# Patient Record
Sex: FEMALE | Race: WHITE | Employment: FULL TIME
[De-identification: names, ages, dates, MRNs, and addresses within clinical notes are randomized per-mention and may not be internally consistent; named-entity substitution may affect disease eponyms.]

---

## 2024-08-21 ENCOUNTER — HOSPITAL ENCOUNTER (OUTPATIENT)
Facility: HOSPITAL | Age: 39
Setting detail: RECURRING SERIES
Discharge: HOME | End: 2024-08-24
Payer: COMMERCIAL

## 2024-08-21 PROCEDURE — 97112 NEUROMUSCULAR REEDUCATION: CPT

## 2024-08-21 PROCEDURE — 97530 THERAPEUTIC ACTIVITIES: CPT

## 2024-08-21 PROCEDURE — 97162 PT EVAL MOD COMPLEX 30 MIN: CPT

## 2024-08-21 NOTE — PROGRESS NOTES
[facility] at the [address], [address], [place] [address]   Phone: [phone]     Fax: [phone]       Plan of Care/ Statement of Necessity for Physical Therapy Services    Patient name: Loreto Sutherland Start of Care: 2024   Referral source: Karyn Jade* : 1985    Medical Diagnosis: Right hip pain [M25.551]  Pain in left hip [M25.552]       Onset Date:2023    Treatment Diagnosis: M25.551  RIGHT HIP PAIN                             Prior Hospitalization: see medical history Provider#: [id number]   Medications: Verified on Patient Summary List     Co-morbidities: PMHx/Surgical Hx:  []DM [] HTN (controlled) [] High cholesterol (controlled) [] Cancer [x] Arthritis   [x]Other fibromyalgia, right knee, lower back pain, inverted sternum-surgery to repair   Prior Level of Function:  functionally independent, no AD,   Social/Recreation/Work: 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       The Plan of Care and following information is based on the information from the initial evaluation.  Assessment/ hammonds information:  Loreto Sutherland is a 38 y.o.  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 Patient reports having constant sharp/aching/tightness pain at right lateral hip, right feeling down right ITband, and lower back that increases in the am, laying on side, staying in one position too long, sitting for a long period of time, squatting, and crossing leg which limits their ability with sleeping, car rides, working at desk, stairs, 
working at desk, stairs, chores, and IADLs. Pt has relief with rest, changing positions, Voltram, flexeril, and heat. Pt rates pain 8__/10 max (in the last week) __1_/10 min (in the last week) __1_/10 currently at rest. Pt with intermittent numbness down the right leg into the foot with sitting for a long period of time. Pt sensation is intact to light touch. Patient demonstrates decreased ROM, decreased strength, impaired posture,  and decreased functional mobility tolerance. Pt tender to palpate along right glut med, right piriformis, right glut min, and right lateral greater trochanter. Pt with positive right RENEE, FADIIR, and SCOUR test.  Pt also with positive adductor drop test, dec in lower trunk rotation, and dec in reversal of lumbar lordosis with forward flexion. Pt signs and symptoms are consistent with mechanical lower back and right hip pain with lumbo-pelvic dysfunction. Pt would benefit from skilled physical therapy to address these limitations.      Patient will continue to benefit from skilled PT services to modify and progress therapeutic interventions, analyze and address functional mobility deficits, analyze and address ROM deficits, analyze and address strength deficits, analyze and address soft tissue restrictions, analyze and cue for proper movement patterns, analyze and modify for postural abnormalities, analyze and address imbalance/dizziness, and instruct in home and community integration to address functional deficits and attain remaining goals.    Short Term Goals:   To be accomplished in 6 weeks:  1..Patient will report compliance with HEP 1x/day to aid in rehabilitation program.  Status at IE:Pt was given HEP handout and appeared to understand.  Current:Same as IE     2.Patient will rate pain on greater than 6/10 so pt can perform ADL's.  Status at IE:8__/10 max (in the last week) __1_/10 min (in the last week) __1_/10 currently at rest  Current: Same as IE    Long Term Goals: To be

## 2024-08-30 ENCOUNTER — HOSPITAL ENCOUNTER (OUTPATIENT)
Facility: HOSPITAL | Age: 39
Setting detail: RECURRING SERIES
End: 2024-08-30
Payer: COMMERCIAL

## 2024-08-30 PROCEDURE — 97112 NEUROMUSCULAR REEDUCATION: CPT

## 2024-08-30 PROCEDURE — 97530 THERAPEUTIC ACTIVITIES: CPT

## 2024-08-30 PROCEDURE — 97140 MANUAL THERAPY 1/> REGIONS: CPT

## 2024-08-30 NOTE — PROGRESS NOTES
PHYSICAL / OCCUPATIONAL THERAPY - DAILY TREATMENT NOTE     Patient Name: Loreto Sutherland    Date: 2024    : 1985  Insurance: Payor: DASHA / Plan: [facility] [place] / Product Type: *No Product type* /      Patient  verified Yes     Visit #   Current / Total 2 24   Time   In / Out 9:15 10:04   Pain   In / Out 4 2   Subjective Functional Status/Changes: Reports that she is sore today , just woke up with inc in pain.   Changes to:  Allergies, Med Hx, Sx Hx?   no       TREATMENT AREA =  Right hip pain [M25.551]  Pain in left hip [M25.552]    If an interpreting service is utilized for treatment of this patient, the contents of this document represent the material reviewed with the patient via the .     OBJECTIVE  Therapeutic Procedures:  Tx Min Billable or 1:1 Min (if diff from Tx Min) Procedure, Rationale, Specifics   5  22259 Therapeutic Exercise (timed):  increase ROM, strength, coordination, balance, and proprioception to improve patient's ability to progress to PLOF and address remaining functional goals. (see flow sheet as applicable)    Details if applicable:       15  60281 Therapeutic Activity (timed):  use of dynamic activities replicating functional movements to increase ROM, strength, coordination, balance, and proprioception in order to improve patient's ability to progress to PLOF and address remaining functional goals.  (see flow sheet as applicable)    Details if applicable:     8  22675 Manual Therapy (timed):  decrease pain, increase ROM, increase tissue extensibility, decrease trigger points, and increase postural awareness to improve patient's ability to progress to PLOF and address remaining functional goals.  The manual therapy interventions were performed at a separate and distinct time from the therapeutic activities interventions . Details:        Details if applicable:  Pt in hook lying position: LONG Left AIC correction: Superior T4 (LONG technique) Sternal mobilization with

## 2024-09-04 ENCOUNTER — TELEPHONE (OUTPATIENT)
Facility: HOSPITAL | Age: 39
End: 2024-09-04

## 2024-09-04 NOTE — TELEPHONE ENCOUNTER
Called to see if she is able to come in on CW's 1:20 opening. Acceptd 10:00 opening for tomorrow instead.

## 2024-09-05 ENCOUNTER — HOSPITAL ENCOUNTER (OUTPATIENT)
Facility: HOSPITAL | Age: 39
Setting detail: RECURRING SERIES
Discharge: HOME | End: 2024-09-08
Payer: COMMERCIAL

## 2024-09-05 PROCEDURE — 97530 THERAPEUTIC ACTIVITIES: CPT

## 2024-09-05 PROCEDURE — 97112 NEUROMUSCULAR REEDUCATION: CPT

## 2024-09-05 PROCEDURE — 97140 MANUAL THERAPY 1/> REGIONS: CPT

## 2024-09-09 ENCOUNTER — HOSPITAL ENCOUNTER (OUTPATIENT)
Facility: HOSPITAL | Age: 39
Setting detail: RECURRING SERIES
Discharge: HOME | End: 2024-09-12
Payer: COMMERCIAL

## 2024-09-09 PROCEDURE — 97535 SELF CARE MNGMENT TRAINING: CPT

## 2024-09-09 PROCEDURE — 97112 NEUROMUSCULAR REEDUCATION: CPT

## 2024-09-09 PROCEDURE — 97530 THERAPEUTIC ACTIVITIES: CPT

## 2024-09-11 ENCOUNTER — HOSPITAL ENCOUNTER (OUTPATIENT)
Facility: HOSPITAL | Age: 39
Setting detail: RECURRING SERIES
Discharge: HOME | End: 2024-09-14
Payer: COMMERCIAL

## 2024-09-11 PROCEDURE — 97530 THERAPEUTIC ACTIVITIES: CPT

## 2024-09-11 PROCEDURE — 97112 NEUROMUSCULAR REEDUCATION: CPT

## 2024-09-11 PROCEDURE — 97110 THERAPEUTIC EXERCISES: CPT

## 2024-09-16 ENCOUNTER — HOSPITAL ENCOUNTER (OUTPATIENT)
Facility: HOSPITAL | Age: 39
Setting detail: RECURRING SERIES
Discharge: HOME | End: 2024-09-19
Payer: COMMERCIAL

## 2024-09-16 PROCEDURE — 97530 THERAPEUTIC ACTIVITIES: CPT

## 2024-09-16 PROCEDURE — 97110 THERAPEUTIC EXERCISES: CPT

## 2024-09-16 PROCEDURE — 97112 NEUROMUSCULAR REEDUCATION: CPT

## 2024-09-18 ENCOUNTER — HOSPITAL ENCOUNTER (OUTPATIENT)
Facility: HOSPITAL | Age: 39
Setting detail: RECURRING SERIES
Discharge: HOME | End: 2024-09-21
Payer: COMMERCIAL

## 2024-09-18 PROCEDURE — 97110 THERAPEUTIC EXERCISES: CPT

## 2024-09-18 PROCEDURE — 97530 THERAPEUTIC ACTIVITIES: CPT

## 2024-09-18 PROCEDURE — 97112 NEUROMUSCULAR REEDUCATION: CPT

## 2024-09-23 ENCOUNTER — HOSPITAL ENCOUNTER (OUTPATIENT)
Facility: HOSPITAL | Age: 39
Setting detail: RECURRING SERIES
Discharge: HOME | End: 2024-09-26
Payer: COMMERCIAL

## 2024-09-23 PROCEDURE — 97530 THERAPEUTIC ACTIVITIES: CPT

## 2024-09-23 PROCEDURE — 97112 NEUROMUSCULAR REEDUCATION: CPT

## 2024-09-25 ENCOUNTER — HOSPITAL ENCOUNTER (OUTPATIENT)
Facility: HOSPITAL | Age: 39
Setting detail: RECURRING SERIES
Discharge: HOME | End: 2024-09-28
Payer: COMMERCIAL

## 2024-09-25 PROCEDURE — 97530 THERAPEUTIC ACTIVITIES: CPT

## 2024-09-25 PROCEDURE — 97110 THERAPEUTIC EXERCISES: CPT

## 2024-09-25 PROCEDURE — 97112 NEUROMUSCULAR REEDUCATION: CPT

## 2024-09-30 ENCOUNTER — HOSPITAL ENCOUNTER (OUTPATIENT)
Facility: HOSPITAL | Age: 39
Setting detail: RECURRING SERIES
End: 2024-09-30
Payer: COMMERCIAL

## 2024-09-30 ENCOUNTER — TELEPHONE (OUTPATIENT)
Facility: HOSPITAL | Age: 39
End: 2024-09-30

## 2024-10-02 ENCOUNTER — HOSPITAL ENCOUNTER (OUTPATIENT)
Facility: HOSPITAL | Age: 39
Setting detail: RECURRING SERIES
Discharge: HOME | End: 2024-10-05
Payer: COMMERCIAL

## 2024-10-02 PROCEDURE — 97112 NEUROMUSCULAR REEDUCATION: CPT

## 2024-10-02 PROCEDURE — 97110 THERAPEUTIC EXERCISES: CPT

## 2024-10-02 PROCEDURE — 97530 THERAPEUTIC ACTIVITIES: CPT

## 2024-10-02 NOTE — PROGRESS NOTES
Left: 18/18  Current 10/2/2024 Trunk Rot:  Right: 17 in/16.5     Left 17 in /16.5     4.Patient will improve LEFS to 63/80 points to demonstrate improvement in functional status.  Status at IE:The LEFS   Score=54/80  Last PN Status 9/18/2024:  - Will perform next session   Current 10/2/2024: Progressing - The LEFS   Score=56/80       5. Pt will have negative adductor drop test so that pt can have proper mechanics to perform ADLs.  Status at IE: positive bilaterally  Last PN Status 9/18/2024: Progressing - Adductor Drop Test: Right:negative /negative   Left: mid/negative  Current: 10/2/2024  Hip Add Drop Test: Right:  positive/negative   Left: positive/negative  **hip flexor restriction noted**     6. Pt will have 5 deg inc in hip ROM so that pt can perform squatting task.  Status at IE: FA IR: Right: 20 pinch pain Left:23  FA ER: right: 18 pain left: 24  Passive SLR: Right: 65 pain left:  90  Last PN Status 9/18/2024: Progressing -   FA IR: Right: 30/30   Left: 26/30  FA ER: right: 25/30  left: 30/30  Passive SLR: Right: 80  pain left:  80     PLAN  Yes  Continue plan of care  []  Upgrade activities as tolerated  []  Discharge due to :  []  Other:       Meenakshi Yen, PT, DPT, CIMT    10/2/2024    7:15 AM    Future Appointments   Date Time Provider Department Center   10/2/2024  9:20 AM Meenakshi Yen PT [facility]   10/7/2024  9:20 AM Meenakshi Yen, PT [facility]   10/9/2024 10:40 AM Meenakshi Yen, PT [facility]   10/14/2024  9:20 AM Meenakshi Yen, PT [facility]   10/16/2024  9:20 AM Meenakshi Yen, PT [facility]   10/21/2024  9:20 AM Meenakshi Yen, PT [facility]   10/23/2024 10:00 AM Meenakshi Yen, PT [facility]   10/28/2024  9:20 Meenakshi Harrell, PT [facility]

## 2024-10-07 ENCOUNTER — HOSPITAL ENCOUNTER (OUTPATIENT)
Facility: HOSPITAL | Age: 39
Setting detail: RECURRING SERIES
Discharge: HOME | End: 2024-10-10
Payer: COMMERCIAL

## 2024-10-07 PROCEDURE — 97112 NEUROMUSCULAR REEDUCATION: CPT

## 2024-10-07 PROCEDURE — 97110 THERAPEUTIC EXERCISES: CPT

## 2024-10-07 PROCEDURE — 97530 THERAPEUTIC ACTIVITIES: CPT

## 2024-10-07 NOTE — PROGRESS NOTES
Score=56/80        5. Pt will have negative adductor drop test so that pt can have proper mechanics to perform ADLs.  Status at IE: positive bilaterally  Last PN Status 9/18/2024: Progressing - Adductor Drop Test: Right:negative /negative   Left: mid/negative  Current: 10/7/2024  Hip Add Drop Test: Right:  positive/negative   Left: positive/negative  **hip flexor restriction noted**     6. Pt will have 5 deg inc in hip ROM so that pt can perform squatting task.  Status at IE: FA IR: Right: 20 pinch pain Left:23  FA ER: right: 18 pain left: 24  Passive SLR: Right: 65 pain left:  90  Last PN Status 9/18/2024: Progressing -   FA IR: Right: 30/30   Left: 26/30  FA ER: right: 25/30  left: 30/30  Passive SLR: Right: 80  pain left:  80     PLAN  Yes  Continue plan of care  []  Upgrade activities as tolerated  []  Discharge due to :  []  Other:    Meenakshi Yen, PT, DPT, CIMT    10/7/2024    8:30 AM    Future Appointments   Date Time Provider Department Center   10/7/2024  9:20 AM Meenakshi Yen, PT [facility]   10/9/2024 10:40 AM Meenakshi Yen, PT [facility]   10/14/2024  9:20 AM Meenakshi Yen, PT [facility]   10/16/2024  9:20 AM Meenakshi Yen, PT [facility]   10/21/2024  9:20 AM Meenakshi Yen, PT [facility]   10/23/2024 10:00 AM Meenakshi Yen, PT [facility]   10/28/2024  9:20 AM Meenakshi Yen, PT [facility]

## 2024-10-09 ENCOUNTER — HOSPITAL ENCOUNTER (OUTPATIENT)
Facility: HOSPITAL | Age: 39
Setting detail: RECURRING SERIES
Discharge: HOME | End: 2024-10-12
Payer: COMMERCIAL

## 2024-10-09 PROCEDURE — 97110 THERAPEUTIC EXERCISES: CPT

## 2024-10-09 PROCEDURE — 97530 THERAPEUTIC ACTIVITIES: CPT

## 2024-10-09 PROCEDURE — 97112 NEUROMUSCULAR REEDUCATION: CPT

## 2024-10-09 NOTE — PROGRESS NOTES
status.  Status at IE:The LEFS   Score=54/80  Last PN Status 9/18/2024:  - Will perform next session   Performed on 10/2/2024: Progressing - The LEFS   Score=56/80        5. Pt will have negative adductor drop test so that pt can have proper mechanics to perform ADLs.  Status at IE: positive bilaterally  Last PN Status 9/18/2024: Progressing - Adductor Drop Test: Right:negative /negative   Left: mid/negative  Current: 10/9/2024 Hip Add Drop Test: Right:  almost midline/negative   Left: positive/negative  **hip flexor restriction noted**     6. Pt will have 5 deg inc in hip ROM so that pt can perform squatting task.  Status at IE: FA IR: Right: 20 pinch pain Left:23  FA ER: right: 18 pain left: 24  Passive SLR: Right: 65 pain left:  90  Last PN Status 9/18/2024: Progressing -   FA IR: Right: 30/30   Left: 26/30  FA ER: right: 25/30  left: 30/30  Passive SLR: Right: 80  pain left:  80     PLAN  Yes  Continue plan of care  []  Upgrade activities as tolerated  []  Discharge due to :  []  Other:    Meenakshi Yen, PT, DPT, CIMT    10/9/2024    7:18 AM    Future Appointments   Date Time Provider Department Center   10/9/2024 10:40 AM Meenakshi Yen, PT [facility]   10/14/2024  9:20 AM Meenakshi Yen, PT [facility]   10/16/2024  9:20 AM Meenakshi Yen, PT [facility]   10/21/2024  9:20 AM Meenakshi Yen, PT [facility]   10/23/2024 10:00 AM Meenakshi Yen, PT [facility]   10/28/2024  9:20 AM Meenakshi Yen, PT [facility]

## 2024-10-14 ENCOUNTER — HOSPITAL ENCOUNTER (OUTPATIENT)
Facility: HOSPITAL | Age: 39
Setting detail: RECURRING SERIES
Discharge: HOME | End: 2024-10-17
Payer: COMMERCIAL

## 2024-10-14 PROCEDURE — 97112 NEUROMUSCULAR REEDUCATION: CPT

## 2024-10-14 PROCEDURE — 97530 THERAPEUTIC ACTIVITIES: CPT

## 2024-10-14 PROCEDURE — 97140 MANUAL THERAPY 1/> REGIONS: CPT

## 2024-10-14 NOTE — PROGRESS NOTES
PHYSICAL / OCCUPATIONAL THERAPY - DAILY TREATMENT NOTE     Patient Name: Loreto Sutherland    Date: 10/14/2024    : 1985  Insurance: Payor: DASHA / Plan: [facility] [place] / Product Type: *No Product type* /      Patient  verified Yes     Visit #   Current / Total 13 24   Time   In / Out 9:20 (9:26) 10:11   Pain   In / Out Hip 0/10, back: 3/10 0/10   Subjective Functional Status/Changes: Reports that she went to [place] yesterday didn't have any hip pain, but feeling defeated feeling in the whole back and everything seemed like there were tightening and automatically wants to extend when tight.    Changes to:  Allergies, Med Hx, Sx Hx?   no       TREATMENT AREA =  Right hip pain [M25.551]  Pain in left hip [M25.552]    If an interpreting service is utilized for treatment of this patient, the contents of this document represent the material reviewed with the patient via the .     OBJECTIVE    Therapeutic Procedures:  Tx Min Billable or 1:1 Min (if diff from Tx Min) Procedure, Rationale, Specifics   6 0 [id number] Therapeutic Exercise (timed):  increase ROM, strength, coordination, balance, and proprioception to improve patient's ability to progress to PLOF and address remaining functional goals. (see flow sheet as applicable)    Details if applicable:       10 10 03566 Therapeutic Activity (timed):  use of dynamic activities replicating functional movements to increase ROM, strength, coordination, balance, and proprioception in order to improve patient's ability to progress to PLOF and address remaining functional goals.  (see flow sheet as applicable)    Details if applicable:      64989 Neuromuscular Re-Education (timed):  improve balance, coordination, kinesthetic sense, posture, core stability and proprioception to improve patient's ability to develop conscious control of individual muscles and awareness of position of extremities in order to progress to PLOF and address remaining functional goals. (see

## 2024-10-16 ENCOUNTER — HOSPITAL ENCOUNTER (OUTPATIENT)
Facility: HOSPITAL | Age: 39
Setting detail: RECURRING SERIES
Discharge: HOME | End: 2024-10-19
Payer: COMMERCIAL

## 2024-10-16 PROCEDURE — 97112 NEUROMUSCULAR REEDUCATION: CPT

## 2024-10-16 PROCEDURE — 97140 MANUAL THERAPY 1/> REGIONS: CPT

## 2024-10-16 PROCEDURE — 97530 THERAPEUTIC ACTIVITIES: CPT

## 2024-10-16 NOTE — PROGRESS NOTES
[facility] at the [address], [place] [address]  Phone: [phone]      Fax:  [phone]    Progress Note  Patient name: Loreto Sutherland Start of Care: 2024   Referral source: Karyn Jade* : 1985               Medical Diagnosis: Right hip pain [M25.551]  Pain in left hip [M25.552]        Onset Date:2023               Treatment Diagnosis: M25.551  RIGHT HIP PAIN                             Prior Hospitalization: see medical history Provider#: [id number]   Medications: Verified on Patient Summary List      Co-morbidities: PMHx/Surgical Hx:  []DM [] HTN (controlled) [] High cholesterol (controlled) [] Cancer [x] Arthritis   [x]Other fibromyalgia, right knee, lower back pain, inverted sternum-surgery to repair   Prior Level of Function:  functionally independent, no AD,   Social/Recreation/Work: 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     Updated Goals/Measure of Progress: To be achieved in  10 visits:    Short Term Goals:   To be accomplished in 6 weeks:  1..Patient will report compliance with HEP 1x/day to aid in rehabilitation program.  Status at IE:Pt was given HEP handout and appeared to understand.  PN Status 2024: MET - Reports good compliance         2.Patient will rate pain on greater than 6/10 so pt can perform ADL's.  Status at IE:8__/10 max (in the last week) __1_/10 min (in the last week) __1_/10 currently at rest  Last PN Status 2024: Progressing - 1-8/10 in the last week   Current 10/16/2024:  goal MET- pt with dec in pain in the hip and hasn't had pain in 
services to modify and progress therapeutic interventions, analyze and address functional mobility deficits, analyze and address ROM deficits, analyze and address strength deficits, analyze and address soft tissue restrictions, analyze and cue for proper movement patterns, analyze and modify for postural abnormalities, analyze and address imbalance/dizziness, and instruct in home and community integration to address functional deficits and attain remaining goals.         Progress toward goals / Updated goals:  []  See Progress Note/Recertification     Short Term Goals:   To be accomplished in 6 weeks:  1..Patient will report compliance with HEP 1x/day to aid in rehabilitation program.  Status at IE:Pt was given HEP handout and appeared to understand.  PN Status 9/18/2024: MET - Reports good compliance         2.Patient will rate pain on greater than 6/10 so pt can perform ADL's.  Status at IE:8__/10 max (in the last week) __1_/10 min (in the last week) __1_/10 currently at rest  Last PN Status 9/18/2024: Progressing - 1-8/10 in the last week   Current 10/16/2024:  goal MET- pt with dec in pain in the hip and hasn't had pain in the hip in the last week, pt has been having more mid to lower back pain, rating current pain 5/10 and this has been the worst in the last week         Long Term Goals: To be accomplished in 12 weeks:  1.Patient will increase Bilateral LE strength by at least 5-10# throughout so pt can perform sit to stand transfer with minimal difficulty.  Status at IE:  Pounds per force per hand held dynamometer sitting at 90 deg isometric contraction Left   Right     Knee Extension (L3,4) 35# 40#   Knee Flexion (S1,2) 20# 10#   Last PN Status 9/18/2024:  - not performed d/t dynamometer unavailable   Current 10/16/2024: MET -   Kg per force per hand held digital dynamometer with isometric contraction Left   Right     Knee Extension (L3,4) 21.3 kg  47# 23.9 kg  52.7#   Knee Flexion (S1,2) 23.8 kg  52.5# 25.4

## 2024-10-18 ENCOUNTER — TELEPHONE (OUTPATIENT)
Facility: HOSPITAL | Age: 39
End: 2024-10-18

## 2024-10-21 ENCOUNTER — APPOINTMENT (OUTPATIENT)
Facility: HOSPITAL | Age: 39
End: 2024-10-21
Payer: COMMERCIAL

## 2024-10-23 ENCOUNTER — APPOINTMENT (OUTPATIENT)
Facility: HOSPITAL | Age: 39
End: 2024-10-23
Payer: COMMERCIAL

## 2024-10-28 ENCOUNTER — TELEPHONE (OUTPATIENT)
Facility: HOSPITAL | Age: 39
End: 2024-10-28

## 2024-10-28 ENCOUNTER — HOSPITAL ENCOUNTER (OUTPATIENT)
Facility: HOSPITAL | Age: 39
Setting detail: RECURRING SERIES
End: 2024-10-28
Payer: COMMERCIAL

## 2024-10-28 NOTE — PROGRESS NOTES
PHYSICAL / OCCUPATIONAL THERAPY - DAILY TREATMENT NOTE     Patient Name: Loreto Sutherland    Date: 10/28/2024    : 1985  Insurance: Payor: DASHA / Plan: [facility] [place] / Product Type: *No Product type* /      Patient  verified {YES/NO:68372}     Visit #   Current / Total *** ***   Time   In / Out *** ***   Pain   In / Out *** ***   Subjective Functional Status/Changes: ***   Changes to:  Allergies, Med Hx, Sx Hx?   {YES/NO DIET:969506299}       TREATMENT AREA =  Right hip pain [M25.551]  Pain in left hip [M25.552]    If an interpreting service is utilized for treatment of this patient, the contents of this document represent the material reviewed with the patient via the .     OBJECTIVE    Modalities Rationale:     {[facility] Modality Rationale:62010} to improve patient's ability to progress to PLOF and address remaining functional goals.     min [] Estim Unattended, type/location:                                      []  w/ice    []  w/heat    min [] Estim Attended, type/location:                                     []  w/US     []  w/ice    []  w/heat    []  TENS insruct      min []  Mechanical Traction: type/lbs                   []  pro   []  sup   []  int   []  cont    []  before manual    []  after manual    min []  Ultrasound, settings/location:      min []  Iontophoresis w/ dexamethasone, location:                                               []  take home patch       []  in clinic        min  unbilled []  Ice     []  Heat    location/position:     min []  Paraffin,  details:     min []  Vasopneumatic Device, press/temp:     min []  Whirlpool / Fluido:    If using vaso (only need to measure limb vaso being performed on)      pre-treatment girth :       post-treatment girth :       measured at (landmark location) :      min []  Other:    Skin assessment post-treatment (if applicable):    []  intact    []  redness- no adverse reaction                 []redness - adverse reaction:

## 2024-10-28 NOTE — TELEPHONE ENCOUNTER
pt. called to [facility] due to car trouble. offered to r/s to 6:00 opening today. She is not sure if she erich have her car fixed by then. She is ware this is her last appt. scheduled and wcb later.

## 2024-11-13 ENCOUNTER — TELEPHONE (OUTPATIENT)
Facility: HOSPITAL | Age: 39
End: 2024-11-13

## 2024-11-13 NOTE — TELEPHONE ENCOUNTER
1st Call - Called patient to schedule followup appts as not currently on schedule. Left voicemail for patient to inquire if they would like to schedule follow-ups or discharge.

## 2024-11-20 ENCOUNTER — TELEPHONE (OUTPATIENT)
Facility: HOSPITAL | Age: 39
End: 2024-11-20